# Patient Record
Sex: MALE | Race: WHITE | NOT HISPANIC OR LATINO | Employment: FULL TIME | URBAN - METROPOLITAN AREA
[De-identification: names, ages, dates, MRNs, and addresses within clinical notes are randomized per-mention and may not be internally consistent; named-entity substitution may affect disease eponyms.]

---

## 2017-03-31 RX ORDER — VENLAFAXINE HYDROCHLORIDE 75 MG/1
CAPSULE, EXTENDED RELEASE ORAL
Qty: 30 CAPSULE | Refills: 2 | Status: SHIPPED | OUTPATIENT
Start: 2017-03-31 | End: 2017-06-07 | Stop reason: SDUPTHER

## 2017-06-07 RX ORDER — VENLAFAXINE HYDROCHLORIDE 75 MG/1
CAPSULE, EXTENDED RELEASE ORAL
Qty: 30 CAPSULE | Refills: 1 | Status: SHIPPED | OUTPATIENT
Start: 2017-06-07 | End: 2017-09-27 | Stop reason: SDUPTHER

## 2017-06-08 RX ORDER — BENAZEPRIL HYDROCHLORIDE 5 MG/1
2.5 TABLET ORAL NIGHTLY
Qty: 15 TABLET | Refills: 1 | Status: SHIPPED | OUTPATIENT
Start: 2017-06-08 | End: 2017-07-13 | Stop reason: SDUPTHER

## 2017-07-13 RX ORDER — SIMVASTATIN 40 MG/1
TABLET, FILM COATED ORAL
Qty: 30 TABLET | Refills: 0 | Status: SHIPPED | OUTPATIENT
Start: 2017-07-13

## 2017-07-13 RX ORDER — BENAZEPRIL HYDROCHLORIDE 5 MG/1
2.5 TABLET ORAL NIGHTLY
Qty: 15 TABLET | Refills: 0 | Status: SHIPPED | OUTPATIENT
Start: 2017-07-13 | End: 2017-08-14 | Stop reason: SDUPTHER

## 2017-08-14 ENCOUNTER — OFFICE VISIT (OUTPATIENT)
Dept: CARDIOLOGY | Facility: CLINIC | Age: 62
End: 2017-08-14
Payer: COMMERCIAL

## 2017-08-14 VITALS
SYSTOLIC BLOOD PRESSURE: 132 MMHG | WEIGHT: 232.38 LBS | BODY MASS INDEX: 30.8 KG/M2 | HEART RATE: 98 BPM | DIASTOLIC BLOOD PRESSURE: 86 MMHG | HEIGHT: 73 IN

## 2017-08-14 DIAGNOSIS — I25.810 CORONARY ARTERY DISEASE INVOLVING CORONARY BYPASS GRAFT OF NATIVE HEART WITHOUT ANGINA PECTORIS: Primary | ICD-10-CM

## 2017-08-14 DIAGNOSIS — Z72.0 TOBACCO USE: ICD-10-CM

## 2017-08-14 DIAGNOSIS — I10 ESSENTIAL HYPERTENSION: ICD-10-CM

## 2017-08-14 DIAGNOSIS — E78.00 HYPERCHOLESTEROLEMIA: ICD-10-CM

## 2017-08-14 DIAGNOSIS — N52.9 ERECTILE DYSFUNCTION, UNSPECIFIED ERECTILE DYSFUNCTION TYPE: ICD-10-CM

## 2017-08-14 PROCEDURE — 99214 OFFICE O/P EST MOD 30 MIN: CPT | Mod: S$GLB,,, | Performed by: INTERNAL MEDICINE

## 2017-08-14 PROCEDURE — 3079F DIAST BP 80-89 MM HG: CPT | Mod: S$GLB,,, | Performed by: INTERNAL MEDICINE

## 2017-08-14 PROCEDURE — 3075F SYST BP GE 130 - 139MM HG: CPT | Mod: S$GLB,,, | Performed by: INTERNAL MEDICINE

## 2017-08-14 PROCEDURE — 3008F BODY MASS INDEX DOCD: CPT | Mod: S$GLB,,, | Performed by: INTERNAL MEDICINE

## 2017-08-14 PROCEDURE — 99999 PR PBB SHADOW E&M-EST. PATIENT-LVL III: CPT | Mod: PBBFAC,,, | Performed by: INTERNAL MEDICINE

## 2017-08-14 RX ORDER — BENAZEPRIL HYDROCHLORIDE 5 MG/1
2.5 TABLET ORAL NIGHTLY
Qty: 15 TABLET | Refills: 5 | Status: SHIPPED | OUTPATIENT
Start: 2017-08-14 | End: 2018-02-20 | Stop reason: SDUPTHER

## 2017-08-14 NOTE — PROGRESS NOTES
Subjective:    Patient ID:  Fei Gomez is a 61 y.o. male who presents for Coronary Artery Disease; Hypertension; and Hyperlipidemia      HPI  Pt was followed at [facility], NEW TO THIS CLINIC, DOING OK, NO LABS, LAST LABS 5/2016, SEE ROS  Past Medical History:   Diagnosis Date    Acute coronary syndrome     CHD (coronary heart disease)     Heart murmur     Hyperlipidemia     Hypertension      Past Surgical History:   Procedure Laterality Date    APPENDECTOMY      CARDIAC CATHETERIZATION      CHOLECYSTECTOMY      CORONARY ARTERY BYPASS GRAFT      gastric sleeve      KNEE ARTHROSCOPY       Family History   Problem Relation Age of Onset    Heart disease Father     Stroke Mother     No Known Problems Maternal Grandmother     No Known Problems Maternal Grandfather     No Known Problems Paternal Grandmother     No Known Problems Paternal Grandfather      Social History     Social History    Marital status:      Spouse name: N/A    Number of children: N/A    Years of education: N/A     Social History Main Topics    Smoking status: Never Smoker    Smokeless tobacco: Current User     Types: Snuff    Alcohol use No    Drug use: No    Sexual activity: Not Asked     Other Topics Concern    None     Social History Narrative    None       Review of patient's allergies indicates:  No Known Allergies    Current Outpatient Prescriptions:     aspirin (ECOTRIN) 81 MG EC tablet, Take 81 mg by mouth once daily., Disp: , Rfl:     benazepril (LOTENSIN) 5 MG tablet, Take 0.5 tablets (2.5 mg total) by mouth nightly., Disp: 15 tablet, Rfl: 5    CYANOCOBALAMIN, VITAMIN B-12, (B-12 DOTS ORAL), Take 1 tablet by mouth once daily. , Disp: , Rfl:     multivitamin capsule, Take 1 capsule by mouth once daily., Disp: , Rfl:     simvastatin (ZOCOR) 40 MG tablet, 1 tablet in the evening Once a day/HS Orally 30 day(s), Disp: 30 tablet, Rfl: 0    venlafaxine (EFFEXOR-XR) 75 MG 24 hr capsule, TAKE ONE CAPSULE BY  "MOUTH ONCE DAILY WITH FOOD 30 days, Disp: 30 capsule, Rfl: 1    omeprazole (PRILOSEC) 40 MG capsule, Take 1 capsule (40 mg total) by mouth every morning., Disp: 30 capsule, Rfl: 0    Review of Systems   Constitution: Negative for chills, diaphoresis, weakness, malaise/fatigue and night sweats.   HENT: Negative for congestion, hearing loss and nosebleeds.    Eyes: Negative for blurred vision, discharge, double vision and visual disturbance.   Cardiovascular: Negative for chest pain, claudication, cyanosis, dyspnea on exertion, irregular heartbeat, leg swelling, near-syncope, orthopnea, palpitations, paroxysmal nocturnal dyspnea and syncope.   Respiratory: Negative for cough, hemoptysis, shortness of breath, sleep disturbances due to breathing, sputum production and wheezing.    Endocrine: Negative for cold intolerance, heat intolerance and polyuria.   Hematologic/Lymphatic: Negative for adenopathy. Does not bruise/bleed easily.   Skin: Negative for color change, itching and nail changes.   Musculoskeletal: Negative for back pain, falls, joint pain and stiffness.   Gastrointestinal: Negative for bloating, abdominal pain, change in bowel habit, dysphagia, heartburn, hematemesis, jaundice, melena and vomiting.   Genitourinary: Negative for dysuria, flank pain and frequency.        ED   Neurological: Negative for brief paralysis, difficulty with concentration, disturbances in coordination, dizziness, focal weakness, light-headedness, loss of balance, numbness, paresthesias, seizures, sensory change and tremors.   Psychiatric/Behavioral: Negative for altered mental status, depression, memory loss and substance abuse. The patient is not nervous/anxious.    Allergic/Immunologic: Negative for hives and persistent infections.        Objective:      Vitals:    08/14/17 1200   BP: 132/86   Pulse: 98   Weight: 105.4 kg (232 lb 5.8 oz)   Height: 6' 1" (1.854 m)   PainSc: 0-No pain     Body mass index is 30.66 kg/m².    Physical " Exam   Constitutional: He is oriented to person, place, and time. He appears well-developed and well-nourished. He is active.   HENT:   Head: Normocephalic and atraumatic.   Mouth/Throat: Oropharynx is clear and moist and mucous membranes are normal.   Eyes: Conjunctivae and EOM are normal. Pupils are equal, round, and reactive to light.   Neck: Normal range of motion. Neck supple. Normal carotid pulses, no hepatojugular reflux and no JVD present. Carotid bruit is not present. No tracheal deviation, no edema and no erythema present. No thyromegaly present.   Cardiovascular: Normal rate, regular rhythm and normal heart sounds.   No extrasystoles are present. PMI is not displaced.  Exam reveals no gallop, no distant heart sounds, no friction rub and no midsystolic click.    No murmur heard.  Pulses:       Carotid pulses are 2+ on the right side, and 2+ on the left side.       Radial pulses are 2+ on the right side, and 2+ on the left side.        Femoral pulses are 2+ on the right side, and 2+ on the left side.       Popliteal pulses are 2+ on the right side, and 2+ on the left side.        Dorsalis pedis pulses are 2+ on the right side, and 2+ on the left side.        Posterior tibial pulses are 2+ on the right side, and 2+ on the left side.   Pulmonary/Chest: Effort normal and breath sounds normal. No accessory muscle usage. No tachypnea and no bradypnea. No respiratory distress.   Abdominal: Soft. Bowel sounds are normal. He exhibits no distension and no mass. There is no hepatosplenomegaly. There is no tenderness. There is no CVA tenderness.   Musculoskeletal: Normal range of motion. He exhibits no edema or deformity.   Lymphadenopathy:     He has no cervical adenopathy.   Neurological: He is alert and oriented to person, place, and time. He has normal strength. He displays no tremor. No cranial nerve deficit. He exhibits normal muscle tone. Coordination normal.   Skin: Skin is warm and dry. No cyanosis or  erythema. No pallor.   Psychiatric: He has a normal mood and affect. His speech is normal and behavior is normal. Judgment and thought content normal.               ..    Chemistry        Component Value Date/Time     05/16/2016 1443    K 4.0 05/16/2016 1443     05/16/2016 1443    CO2 24 05/16/2016 1443    BUN 14 05/16/2016 1443    CREATININE 1.02 05/16/2016 1443     (H) 05/16/2016 1443        Component Value Date/Time    CALCIUM 9.2 05/16/2016 1443    ALKPHOS 69 05/16/2016 1443    AST 23 05/16/2016 1443    AST 98 (H) 02/29/2016 1354    ALT 26 05/16/2016 1443    BILITOT 0.3 05/16/2016 1443    ESTGFRAFRICA 91 05/16/2016 1443    EGFRNONAA 78 05/16/2016 1443            ..No results found for: CHOL  No results found for: HDL  No results found for: LDLCALC  No results found for: TRIG  No results found for: CHOLHDL  ..  Lab Results   Component Value Date    WBC 3.89 (L) 02/29/2016    HGB 14.2 02/29/2016    HCT 43.9 02/29/2016    MCV 84 02/29/2016     02/29/2016       Test(s) Reviewed  I have reviewed the following in detail:  [] Stress test   [] Angiography   [] Echocardiogram   [x] Labs   [x] Other:       Assessment:         ICD-10-CM ICD-9-CM   1. Coronary artery disease involving coronary bypass graft of native heart without angina pectoris I25.810 414.05   2. Essential hypertension I10 401.9   3. Erectile dysfunction, unspecified erectile dysfunction type N52.9 607.84   4. Hypercholesterolemia E78.00 272.0   5. Tobacco use Z72.0 305.1     Problem List Items Addressed This Visit        Cardiac/Vascular    CHD (coronary heart disease) - Primary    Relevant Orders    Lipid panel    Comprehensive metabolic panel    TSH    Hypercholesterolemia    Relevant Orders    Lipid panel    Comprehensive metabolic panel    Essential hypertension    Relevant Orders    Comprehensive metabolic panel       Renal/    Erectile dysfunction    Relevant Orders    TESTOSTERONE PANEL       Other    Tobacco use       Other Visit Diagnoses    None.          Plan:         ALL CV CLINICALLY STABLE, NO ANGINA, NO HF, NO TIA, NO CLINICAL ARRHYTHMIA,CONTINUE CURRENT MEDS, EDUCATION, DIET, EXERCISE, TOBACCO CESSATION, LABS SOON, WANTS TESTOSTERONE LEVEL, RTC IN 8 MO  Coronary artery disease involving coronary bypass graft of native heart without angina pectoris  -     Cancel: Comprehensive metabolic panel; Future; Expected date: 08/14/2017  -     Cancel: Lipid panel; Future; Expected date: 08/14/2017  -     Cancel: TSH; Future; Expected date: 08/14/2017  -     Cancel: Comprehensive metabolic panel; Future; Expected date: 08/14/2017  -     Cancel: Lipid panel; Future; Expected date: 08/14/2017  -     Cancel: TSH; Future; Expected date: 08/14/2017  -     Lipid panel; Future; Expected date: 08/14/2017  -     Comprehensive metabolic panel; Future; Expected date: 08/14/2017  -     TSH; Future; Expected date: 08/14/2017    Essential hypertension  -     Cancel: Comprehensive metabolic panel; Future; Expected date: 08/14/2017  -     Cancel: Comprehensive metabolic panel; Future; Expected date: 08/14/2017  -     Cancel: Lipid panel; Future; Expected date: 08/14/2017  -     Cancel: TSH; Future; Expected date: 08/14/2017  -     Cancel: TESTOSTERONE PANEL; Future; Expected date: 08/14/2017  -     Comprehensive metabolic panel; Future; Expected date: 08/14/2017    Erectile dysfunction, unspecified erectile dysfunction type  -     Cancel: TESTOSTERONE PANEL; Future; Expected date: 08/14/2017  -     Cancel: TESTOSTERONE PANEL; Future; Expected date: 08/14/2017  -     TESTOSTERONE PANEL; Future; Expected date: 08/14/2017    Hypercholesterolemia  -     Cancel: Comprehensive metabolic panel; Future; Expected date: 08/14/2017  -     Cancel: Lipid panel; Future; Expected date: 08/14/2017  -     Cancel: Comprehensive metabolic panel; Future; Expected date: 08/14/2017  -     Cancel: Lipid panel; Future; Expected date: 08/14/2017  -     Cancel: TSH;  Future; Expected date: 08/14/2017  -     Cancel: TESTOSTERONE PANEL; Future; Expected date: 08/14/2017  -     Lipid panel; Future; Expected date: 08/14/2017  -     Comprehensive metabolic panel; Future; Expected date: 08/14/2017    Tobacco use    Other orders  -     benazepril (LOTENSIN) 5 MG tablet; Take 0.5 tablets (2.5 mg total) by mouth nightly.  Dispense: 15 tablet; Refill: 5    RTC Low level/low impact aerobic exercise 5x's/wk. Heart healthy diet and risk factor modification.    See labs and med orders.    Aerobic exercise 5x's/wk. Heart healthy diet and risk factor modification.    See labs and med orders.

## 2017-08-14 NOTE — PATIENT INSTRUCTIONS
Taking Your Blood Pressure  Blood pressure is the force of blood against the artery wall as it moves from the heart through the blood vessels. You can take your own blood pressure reading using a digital monitor. Take readings as often as your healthcare provider instructs. Take each reading at the same time of day.  Step 1. Relax    · Take your blood pressure at the same time every day, such as in the morning or evening, or at the time your healthcare provider recommends.  · Wait at least a half-hour after smoking, eating, drinking caffeinated beverages, or exercising.  · Sit comfortably at a table with both feet on the floor. Do not cross your legs or feet. Place the monitor near you.  · Rest for a few minutes before you begin.  Step 2. Wrap the cuff    · Place your arm on the table, palm up. Your arm should be at the level of your heart. Wrap the cuff around your upper arm, just above your elbow. Its best done on bare skin, not over clothing. Most cuffs will indicate where the brachial artery (the blood vessel in the middle of the arm at the inner side of the elbow) should line up with the cuff. Look in your monitor's instruction booklet for an illustration. You can also bring your cuff to your healthcare provider and have them show you how to correctly place the cuff.  · Make sure your cuff fits. If it doesnt wrap around your upper arm, order a larger cuff.  Step 3. Inflate the cuff    · Pump the cuff until the scale reads 160. If you have a self-inflating cuff, push the button that starts the pump.  · The cuff will tighten, then loosen.  · The numbers will change. When they stop changing, your blood pressure reading will appear.  · Take 2 or 3 readings one minute apart.  Step 4. Write down the results of each reading    · Write down your blood pressure numbers for each reading. Note the date and time. Keep your results in one place, such as a notebook. Even if your monitor has a built-in memory, keep a hard  copy of the readings.  · Remove the cuff from your arm. Turn off the machine.  · Share your blood pressure records with your healthcare providers at each visit.  Date Last Reviewed: 4/27/2016  © 7814-4202 [employer]. [address] [place] [address]. All rights reserved. This information is not intended as a substitute for professional medical care. Always follow your healthcare professional's instructions.        Understanding Erectile Dysfunction    Erectile dysfunction (ED) is a problem getting an erection firm enough or keeping it long enough for intercourse. The problem can happen to any man at any age. But health problems that can lead to ED become more common as a man ages. Up to half of men over age 40 experience ED at some point.  Causes of ED  ED can have many causes. Most are physical. Some are emotional issues. Often, a combination of causes is involved. Causes of ED may include:  · Medical conditions such as diabetes or depression  · Smoking tobacco or marijuana  · Drinking too much alcohol  · Side effects of medications  · Injury to nerves or blood vessels  · Emotional issues such as stress or relationship problems  ED can be treated  Prescription medications for ED are available. They help many men who try them. Depending upon the cause of the ED, though, medications may not be enough. In these cases, other treatment options are available. These include erectile aids and surgery. Your health care provider can tell you more about the treatment that is right for you. And new treatments for ED are being studied. No matter what the treatment you decide on, stay in touch with your doctor. If your symptoms persist, he or she may be able to adjust your current treatment or try something new.  Date Last Reviewed: 9/22/2014  © 2522-1271 [employer]. [address] [place] [address]. All rights reserved. This information is not intended as a substitute for  professional medical care. Always follow your healthcare professional's instructions.        Heart Disease Education    The heart beats 60 to 100 times per minute, 24 hours a day. This equals almost 1000,000 times a day. It pumps blood with oxygen and nutrients to the tissues and organs of the body. But the heart is a muscle and needs its own supply of blood. Blood flow to the heart is supplied by the coronary arteries. Coronary artery disease (atherosclerosis) is a result of cholesterol, saturated fat, and calcium deposits (plaques) that build up inside the walls. This causes inflammation within the coronary arteries. These plaques narrow the artery and reduce blood flow to the heart muscle. The reduction in blood flow to the heart muscle decreases oxygen supply to the heart. If the narrowing is significant enough, the oxygen supply to one or more regions of the heart can be temporarily or permanently shut down. This can cause chest pain, and possibly death of heart tissue (heart attack).  Types of chest pain  Angina is the name for pain in the heart muscle. Angina is a warning sign of serious heart disease. When untreated it can lead to a heart attack, also known as acute myocardial infarction, or AMI. Angina occurs when there is not enough blood and oxygen flowing to the heart for the amount of work it is doing. This most often happens during physical exertion, when the heart is working hardest. It is usually relieved by rest or nitroglycerin. Angina may also occur after a large meal when extra blood is sent to the digestive organs and less goes to the heart. In the case of advanced or unstable heart disease, angina can occur at rest or awaken you from sleep. Angina usually lasts from a few minutes up to 20 minutes or more. When treated early, the effects of angina can be reversed without permanent damage to the heart. Angina is a serious condition and needs to be evaluated by a medical professional  immediately.  There are two types of angina -- stable and unstable:  · Stable angina usually occurs with a predictable level of activity. Being stable, its character, severity, and occurrence do not change much over time. It usually starts with activity, and resolves with rest or taking your medicine as instructed by your doctor. The symptoms usually do not last long.  · Unstable angina changes or gets worse over time. It is different from whatever you are used to. It may feel different or worse, begin without cause, occur with exercise or exertion, wake you up from sleep, and last longer. It may not respond in the same way as it does when you take your usual medicines for an attack. This type of angina can be a warning sign of an impending heart attack.     A heart attack is usually the result of a blood clot that suddenly forms in a coronary artery that has been narrowed with plaque. When this occurs, blood flow may be cut off to a part of the heart muscle, causing the cells to die. This weakens the pumping action of the heart, which affects the delivery of blood to all the other organs in the body including the brain. This damage is not reversible. However, early treatment can limit the amount of damage.  The pain you feel with angina and a heart attack may have a similar quality. However, it is usually different in intensity and duration. Here are some typical descriptions of a heart attack:  · It is most often experienced as a squeezing, crushing, pressure-like sensation in the center of the chest.  · It is sometimes described as something heavy sitting on my chest.  · It may feel more like a bad case of indigestion.  · The pain may spread from the chest to the arm, shoulder, throat or jaw.  · Sometimes the pain is not felt in the chest at all, but only in the arm, shoulder, throat or jaw.  · There may also be nausea, vomiting, dizziness or light-headedness, sweating and trouble breathing.  · Palpitations, or  "your heart beating rapidly  · A new, irregular heart beat  · Unexplained weakness  You may not be able to tell the difference between "bad" angina and a heart attack at home. Seek help if your symptoms are different than usual. Do not be in denial or just try to "tough it out."  Call 911  This is the fastest and safest way to get to the emergency department. The paramedics can also start treatment on the way to the hospital, saving valuable time for your heart.  · If the angina gets worse, if it continues, or if it stops and returns, call 911 immediately. Do not delay. You may be having a heart attack.  · After you call 911, take a second tablet or spray unless instructed otherwise. When repeating doses, sit down if possible, because it can make you feel lightheaded or dizzy. Wait another 5 minutes. If the angina still does not go away, take a third tablet or spray. Do not take more than 3 tablets or sprays within 15 minutes. Stay on the phone with 911 for further instruction.  · Your healthcare provider may give you slightly different instructions than those above. If so, follow them carefully.  Do not wait until symptoms become severe to call 911.  Other reasons to call 911 include:  · Trouble breathing  · Feeling lightheaded, faint, or dizzy  · Rapid heart beat  · Slower than usual heart rate compared to your normal  · Angina with weakness, dizziness, fainting, heavy sweating, nausea, or vomiting  · Extreme drowsiness, confusion  · Weakness of an arm or leg or one side of the face  · Difficulty with speech or vision  When to seek medical care  Remember, the signs and symptoms of a heart attack are not always like they are on TV. Sometimes they are not so obvious. You may only feel weak, or just not right. If it is not clear or if you have any doubt, call for advice.  · Seek help if there is a change in the type of pain, if it feels different, or if your symptoms are mild.  · Do not drive yourself. Have someone " "else drive you. If no one can drive, call 911.  · Do not delay. Fast diagnosis and treatment can prevent or limit the amount of heart damage during a heart attack.  · Do not go to your doctor's office or a clinic as they may not be able to provide all the testing and treatment required for this condition.  · If your doctor has given you medicine to take when symptoms occur, take them but don't delay getting help trying to locate medicines.  What happens in the emergency department  The emergency department is connected to your local emergency medical system (EMS) through 911. That's why during a cardiac emergency, calling 911 is the fastest way to get help. The goal of the emergency department is to rapidly screen, evaluate, and treat people.  Once you are there, an electrocardiogram (ECG or heart tracing) will be done. Blood samples may be taken to look for the presence of heart enzymes that leak from damaged heart cells and show if a heart attack is occurring. You will often be evaluated by a heart specialist (cardiologist) who decides the best course of action. In the case of severe angina or early heart attack, and depending on the circumstances, powerful "clot busting" medicines can be used to dissolve blood clots in the coronary artery. In other cases, you may be taken to a cardiac catheterization lab. Here, a tiny balloon-tipped catheter is advanced through blood vessels to the heart. There the balloon is inflated pushing open the blood vessel restoring blood flow.  Risk factors for heart disease  Risk factors for heart disease are a combination of genetic and lifestyle. Many risk factors work by either directly or indirectly damaging the blood vessels of the heart, or by increasing the risk of forming blood or cholesterol clots, which then clog up and block the arteries.     Examples of physical lifestyle risk factors:  · Cigarette smoking  · High blood pressure  · High blood cholesterol  · Use of stimulant " drugs such as cocaine, crack, and amphetamines  · Eating a high-fat, high-cholesterol meal  · Diabetes   · Obesity which increases risk for diabetes and high blood pressure  · Lack of regular physical activity     Examples of emotional lifestyle factors:  · Chronic high stress levels release stress hormones. These raise blood pressure and cholesterol level and makes blood clot more easily.  · Held-in anger, hostile or cynical attitude  · Social and emotional isolation, lack of intimacy  · Loss of relationship  · Depression  Other factors that increase the risk of heart attack that you cannot control :  · Age. The older you get beyond 40, the greater is your risk of significant coronary artery disease.  · Gender. More men than women get heart disease; but once past menopause, women who are not taking estrogen replacement have the same risk as men for a heart attack.  · Family history. If your mother, father, brother or sister has coronary artery disease, your risk of having it is higher than a person your age without this family history.  What can you do to decrease your risk  To reduce your risk of heart disease:  · Get regular checkups with your doctor.  · Take your medicines for blood pressure, cholesterol or diabetes as directed.  · Watch your diet. Eat a heart healthy diet choosing fresh foods, less salt, cholesterol, and fat  · Stop smoking. Get help if needed.  · Get regular exercise.  · Manage stress.  · Carry a list of medicines and doses in your wallet.  Date Last Reviewed: 12/30/2015  © 5702-8207 [employer]. [address] [place] [address], [place] [address]. All rights reserved. This information is not intended as a substitute for professional medical care. Always follow your healthcare professional's instructions.        Coping with Smoking Withdrawal  For the first few days after you quit smoking, you may feel cranky, restless, depressed, or low on energy. These are symptoms of withdrawal. Your  body needs time to recover from smoking. Your symptoms should lessen within a few days.    Coping with the urge to smoke  · Deep-breathe. Inhale through your nose. Count to five. Slowly exhale through your mouth.  · Drink water. Try to drink eight or more 8-ounce glasses of water a day.  · Keep your hands busy. Wash your car. Draw. Do a puzzle. Build a [employer].  · Delay. The urge to smoke lasts only 3 to 5 minutes.  Get support  Individual, group, and telephone counseling can help keep you on track. Ask your healthcare provider for more information about resources available to you.  Control stress  After you quit, you may feel irritable and stressed. Try taking a warm bath or shower. Listen to music. Try yoga or meditate. Call friends or talk with a professional.  Exercise  Exercise helps your body and mind feel better. There are many ways to be more active. Find something you enjoy doing. See if a friend will join you for a walk or a bike ride.  Sleep better  You may feel tired but have trouble falling asleep. Try to relax before bed. Do a few stretching exercises. Read for a while. Also, avoid caffeine for at least a few hours before bedtime.  Get fit, not fat  You may notice an increased appetite. Many people who quit smoking gain a few pounds. To limit weight gain, try to watch what you eat. Cut back on fat in your diet. Snack on low-calorie foods, like fresh fruits and vegetables. Drink low-calorie liquids, especially water. Regular exercise can also help you stay fit. And remember: Your main goal is to be a nonsmoker. Stay focused on that goal.  Quit-smoking products  There are a number of products that can help you quit smoking including medications and nicotine replacement products. They are available over the counter or by prescription. Ask your healthcare provider if any of these could help you quit smoking.        For more information  · smokefree.gov/bvku-dc-oi-expert  · National Cancer [place]  Smoking Quitline: 877-44U-QUIT (430-357-7113)      Date Last Reviewed: 11/17/2014  © 1155-5097 [employer]. [address] [place] [address], [place] [address]. All rights reserved. This information is not intended as a substitute for professional medical care. Always follow your healthcare professional's instructions.        Understanding Coronary Artery Disease (CAD)    To understand coronary artery disease (CAD), you need to know how your heart works. Your heart is a muscle that pumps blood throughout your body. To work right, your heart needs a steady supply of oxygen. It gets this oxygen from blood supplied by the coronary arteries.        Healthy Artery      Damaged Artery      Narrowed Artery      Blocked Artery   Healthy artery. When a coronary artery is healthy and has no blockages, blood flows through easily. Healthy arteries can easily supply the oxygen-rich blood your heart needs.  Damaged artery. Coronary artery disease begins when damage to the artery lining leads to the buildup of fat-like substances and cholesterol along the artery wall. This is called plaque. This damage could be caused by things like high blood pressure or smoking. This plaque buildup begins to narrow the arteries carrying blood to the heart. This is called atherosclerosis,  Narrowed artery. As more plaque builds up, your artery has trouble supplying blood to your heart muscle when it needs it most, such as during exercise. You may not feel any symptoms when this happens. Or you may feel angina--pressure, tightness, achiness, or pain in your chest, jaw, neck, back, or arm.  Blocked artery. A piece of plaque may break off and completely block the artery. Or a blood clot may plug the narrowed artery. When this happens, blood flow is blocked from reaching the heart. Without oxygen-rich blood, part of the heart muscle becomes damaged and stops working. You may feel crushing pressure or pain in or around your chest. This is a heart  attack (acute myocardial infarction, or AMI) and is a medical emergency.  Date Last Reviewed: 3/28/2016  © 9974-4074 [employer]. [address] [place] [address], [place] [address]. All rights reserved. This information is not intended as a substitute for professional medical care. Always follow your healthcare professional's instructions.

## 2017-09-27 RX ORDER — VENLAFAXINE HYDROCHLORIDE 75 MG/1
75 CAPSULE, EXTENDED RELEASE ORAL DAILY
Qty: 90 CAPSULE | Refills: 1 | Status: SHIPPED | OUTPATIENT
Start: 2017-09-27 | End: 2017-09-27 | Stop reason: SDUPTHER

## 2017-09-27 NOTE — TELEPHONE ENCOUNTER
Effexor sent to pharmacy, Dr Green refers pt to new PCP for testerone, CG verbalizes understanding

## 2017-09-27 NOTE — TELEPHONE ENCOUNTER
----- Message from Miguel Dimas sent at 9/27/2017  3:48 PM CDT -----  Contact: Wife-Diana  [phone]  Patient needs a refill on rx Effexor 75 mg, the patient's wife asking for the status for testosterone vaccine. Thanks!

## 2017-12-12 ENCOUNTER — TELEPHONE (OUTPATIENT)
Dept: CARDIOLOGY | Facility: CLINIC | Age: 62
End: 2017-12-12

## 2017-12-12 NOTE — TELEPHONE ENCOUNTER
----- Message from Nakia Gregory sent at 12/12/2017  3:35 PM CST -----  Contact: wife, Diana Chancellor  Patient's wife, Diana Gomez regarding PCP doctor needs a clearance to write a prescription for marginally low testosterone. Please call patient at [phone] or wife at [phone]. Thanks!

## 2018-02-20 DIAGNOSIS — I10 HYPERTENSION, UNSPECIFIED TYPE: Primary | ICD-10-CM

## 2018-02-21 RX ORDER — BENAZEPRIL HYDROCHLORIDE 5 MG/1
2.5 TABLET ORAL NIGHTLY
Qty: 15 TABLET | Refills: 5 | Status: SHIPPED | OUTPATIENT
Start: 2018-02-21 | End: 2020-07-29 | Stop reason: DRUGHIGH

## 2020-06-12 ENCOUNTER — TELEPHONE (OUTPATIENT)
Dept: CARDIOLOGY | Facility: CLINIC | Age: 65
End: 2020-06-12

## 2020-06-12 DIAGNOSIS — I25.10 CORONARY ARTERY DISEASE INVOLVING NATIVE CORONARY ARTERY WITHOUT ANGINA PECTORIS, UNSPECIFIED WHETHER NATIVE OR TRANSPLANTED HEART: Primary | ICD-10-CM

## 2020-06-12 DIAGNOSIS — E78.00 HYPERCHOLESTEROLEMIA: ICD-10-CM

## 2020-06-12 DIAGNOSIS — I10 ESSENTIAL HYPERTENSION: ICD-10-CM

## 2020-06-12 NOTE — TELEPHONE ENCOUNTER
Spoke to pt wife and informed will send lab orders over to [facility] per request. Pt wife verbalized understanding

## 2020-06-12 NOTE — TELEPHONE ENCOUNTER
----- Message from Patrica Soto sent at 6/12/2020 10:59 AM CDT -----  Contact: pt wife  Type: Needs Medical Advice  Who Called:  pt    Pharmacy name and phone #:   Best Call Back Number: [phone]  Additional Information: pt wife says that her  needs orders put in to take labs next week before their visit. Please call to advise.

## 2020-07-29 ENCOUNTER — OFFICE VISIT (OUTPATIENT)
Dept: CARDIOLOGY | Facility: CLINIC | Age: 65
End: 2020-07-29
Payer: COMMERCIAL

## 2020-07-29 VITALS
DIASTOLIC BLOOD PRESSURE: 92 MMHG | BODY MASS INDEX: 31.82 KG/M2 | SYSTOLIC BLOOD PRESSURE: 124 MMHG | WEIGHT: 240.06 LBS | OXYGEN SATURATION: 98 % | HEIGHT: 73 IN | HEART RATE: 90 BPM

## 2020-07-29 DIAGNOSIS — Z72.0 TOBACCO USE: ICD-10-CM

## 2020-07-29 DIAGNOSIS — R09.89 ARTERIAL BRUIT: ICD-10-CM

## 2020-07-29 DIAGNOSIS — I10 ESSENTIAL HYPERTENSION: ICD-10-CM

## 2020-07-29 DIAGNOSIS — I25.719 CORONARY ARTERY DISEASE INVOLVING AUTOLOGOUS VEIN CORONARY BYPASS GRAFT WITH ANGINA PECTORIS: Primary | ICD-10-CM

## 2020-07-29 DIAGNOSIS — E78.00 HYPERCHOLESTEROLEMIA: ICD-10-CM

## 2020-07-29 DIAGNOSIS — E66.9 OBESITY, CLASS I, BMI 30-34.9: ICD-10-CM

## 2020-07-29 PROCEDURE — 1126F PR PAIN SEVERITY QUANTIFIED, NO PAIN PRESENT: ICD-10-PCS | Mod: S$GLB,,, | Performed by: INTERNAL MEDICINE

## 2020-07-29 PROCEDURE — 1126F AMNT PAIN NOTED NONE PRSNT: CPT | Mod: S$GLB,,, | Performed by: INTERNAL MEDICINE

## 2020-07-29 PROCEDURE — 3080F PR MOST RECENT DIASTOLIC BLOOD PRESSURE >= 90 MM HG: ICD-10-PCS | Mod: CPTII,S$GLB,, | Performed by: INTERNAL MEDICINE

## 2020-07-29 PROCEDURE — 3008F PR BODY MASS INDEX (BMI) DOCUMENTED: ICD-10-PCS | Mod: CPTII,S$GLB,, | Performed by: INTERNAL MEDICINE

## 2020-07-29 PROCEDURE — 3074F SYST BP LT 130 MM HG: CPT | Mod: CPTII,S$GLB,, | Performed by: INTERNAL MEDICINE

## 2020-07-29 PROCEDURE — 3008F BODY MASS INDEX DOCD: CPT | Mod: CPTII,S$GLB,, | Performed by: INTERNAL MEDICINE

## 2020-07-29 PROCEDURE — 1159F PR MEDICATION LIST DOCUMENTED IN MEDICAL RECORD: ICD-10-PCS | Mod: S$GLB,,, | Performed by: INTERNAL MEDICINE

## 2020-07-29 PROCEDURE — 99214 PR OFFICE/OUTPT VISIT, EST, LEVL IV, 30-39 MIN: ICD-10-PCS | Mod: S$GLB,,, | Performed by: INTERNAL MEDICINE

## 2020-07-29 PROCEDURE — 3074F PR MOST RECENT SYSTOLIC BLOOD PRESSURE < 130 MM HG: ICD-10-PCS | Mod: CPTII,S$GLB,, | Performed by: INTERNAL MEDICINE

## 2020-07-29 PROCEDURE — 1101F PT FALLS ASSESS-DOCD LE1/YR: CPT | Mod: CPTII,S$GLB,, | Performed by: INTERNAL MEDICINE

## 2020-07-29 PROCEDURE — 99214 OFFICE O/P EST MOD 30 MIN: CPT | Mod: S$GLB,,, | Performed by: INTERNAL MEDICINE

## 2020-07-29 PROCEDURE — 1159F MED LIST DOCD IN RCRD: CPT | Mod: S$GLB,,, | Performed by: INTERNAL MEDICINE

## 2020-07-29 PROCEDURE — 3080F DIAST BP >= 90 MM HG: CPT | Mod: CPTII,S$GLB,, | Performed by: INTERNAL MEDICINE

## 2020-07-29 PROCEDURE — 1101F PR PT FALLS ASSESS DOC 0-1 FALLS W/OUT INJ PAST YR: ICD-10-PCS | Mod: CPTII,S$GLB,, | Performed by: INTERNAL MEDICINE

## 2020-07-29 RX ORDER — BENAZEPRIL HYDROCHLORIDE 5 MG/1
5 TABLET ORAL NIGHTLY
Qty: 90 TABLET | Refills: 1 | Status: SHIPPED | OUTPATIENT
Start: 2020-07-29 | End: 2022-08-16
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
## 2020-08-03 ENCOUNTER — CLINICAL SUPPORT (OUTPATIENT)
Dept: CARDIOLOGY | Facility: CLINIC | Age: 65
End: 2020-08-03
Attending: INTERNAL MEDICINE
Payer: COMMERCIAL

## 2020-08-03 DIAGNOSIS — R09.89 ARTERIAL BRUIT: ICD-10-CM

## 2020-08-03 LAB
LEFT CBA DIAS: 20 CM/S
LEFT CBA SYS: 66 CM/S
LEFT CCA DIST DIAS: 21 CM/S
LEFT CCA DIST SYS: 79 CM/S
LEFT CCA MID DIAS: 23 CM/S
LEFT CCA MID SYS: 85 CM/S
LEFT CCA PROX DIAS: 25 CM/S
LEFT CCA PROX SYS: 115 CM/S
LEFT ECA DIAS: 12 CM/S
LEFT ECA SYS: 76 CM/S
LEFT ICA DIST DIAS: 26 CM/S
LEFT ICA DIST SYS: 55 CM/S
LEFT ICA MID DIAS: 24 CM/S
LEFT ICA MID SYS: 55 CM/S
LEFT ICA PROX DIAS: 31 CM/S
LEFT ICA PROX SYS: 71 CM/S
LEFT VERTEBRAL DIAS: 19 CM/S
LEFT VERTEBRAL SYS: 65 CM/S
OHS CV CAROTID RIGHT ICA EDV HIGHEST: 26
OHS CV CAROTID ULTRASOUND LEFT ICA/CCA RATIO: 0.62
OHS CV CAROTID ULTRASOUND RIGHT ICA/CCA RATIO: 0.82
OHS CV PV CAROTID LEFT HIGHEST CCA: 115
OHS CV PV CAROTID LEFT HIGHEST ICA: 71
OHS CV PV CAROTID RIGHT HIGHEST CCA: 100
OHS CV PV CAROTID RIGHT HIGHEST ICA: 82
OHS CV US CAROTID LEFT HIGHEST EDV: 31
RIGHT CBA DIAS: 25 CM/S
RIGHT CBA SYS: 105 CM/S
RIGHT CCA DIST DIAS: 20 CM/S
RIGHT CCA DIST SYS: 85 CM/S
RIGHT CCA MID DIAS: 223 CM/S
RIGHT CCA MID SYS: 100 CM/S
RIGHT CCA PROX DIAS: 14 CM/S
RIGHT CCA PROX SYS: 90 CM/S
RIGHT ECA DIAS: 13 CM/S
RIGHT ECA SYS: 87 CM/S
RIGHT ICA DIST DIAS: 26 CM/S
RIGHT ICA DIST SYS: 82 CM/S
RIGHT ICA MID DIAS: 22 CM/S
RIGHT ICA MID SYS: 52 CM/S
RIGHT ICA PROX DIAS: 14 CM/S
RIGHT ICA PROX SYS: 76 CM/S
RIGHT VERTEBRAL DIAS: 19 CM/S
RIGHT VERTEBRAL SYS: 55 CM/S

## 2020-08-03 PROCEDURE — 93880 CV US DOPPLER CAROTID (CUPID ONLY): ICD-10-PCS | Mod: S$GLB,,, | Performed by: INTERNAL MEDICINE

## 2020-08-03 PROCEDURE — 93880 EXTRACRANIAL BILAT STUDY: CPT | Mod: S$GLB,,, | Performed by: INTERNAL MEDICINE

## 2020-08-11 ENCOUNTER — HOSPITAL ENCOUNTER (OUTPATIENT)
Dept: RADIOLOGY | Facility: HOSPITAL | Age: 65
Discharge: HOME | End: 2020-08-11
Attending: INTERNAL MEDICINE
Payer: COMMERCIAL

## 2020-08-11 ENCOUNTER — CLINICAL SUPPORT (OUTPATIENT)
Dept: CARDIOLOGY | Facility: CLINIC | Age: 65
End: 2020-08-11
Attending: INTERNAL MEDICINE
Payer: COMMERCIAL

## 2020-08-11 VITALS — BODY MASS INDEX: 31.81 KG/M2 | WEIGHT: 240 LBS | HEIGHT: 73 IN

## 2020-08-11 DIAGNOSIS — I25.719 CORONARY ARTERY DISEASE INVOLVING AUTOLOGOUS VEIN CORONARY BYPASS GRAFT WITH ANGINA PECTORIS: ICD-10-CM

## 2020-08-11 LAB
CV STRESS BASE HR: 85 BPM
DIASTOLIC BLOOD PRESSURE: 96 MMHG
NUC REST EJECTION FRACTION: 56
NUC STRESS EJECTION FRACTION: 69 %
OHS CV CPX 1 MINUTE RECOVERY HEART RATE: 146 BPM
OHS CV CPX 85 PERCENT MAX PREDICTED HEART RATE MALE: 131
OHS CV CPX ESTIMATED METS: 9
OHS CV CPX MAX PREDICTED HEART RATE: 154
OHS CV CPX PATIENT IS FEMALE: 0
OHS CV CPX PATIENT IS MALE: 1
OHS CV CPX PEAK DIASTOLIC BLOOD PRESSURE: 105 MMHG
OHS CV CPX PEAK HEAR RATE: 160 BPM
OHS CV CPX PEAK RATE PRESSURE PRODUCT: NORMAL
OHS CV CPX PEAK SYSTOLIC BLOOD PRESSURE: 222 MMHG
OHS CV CPX PERCENT MAX PREDICTED HEART RATE ACHIEVED: 104
OHS CV CPX RATE PRESSURE PRODUCT PRESENTING: NORMAL
STRESS ECHO POST EXERCISE DUR MIN: 6 MINUTES
STRESS ECHO POST EXERCISE DUR SEC: 1 SECONDS
SYSTOLIC BLOOD PRESSURE: 145 MMHG

## 2020-08-11 PROCEDURE — 93018 CV STRESS TEST I&R ONLY: CPT | Mod: ,,, | Performed by: INTERNAL MEDICINE

## 2020-08-11 PROCEDURE — 93016 CV STRESS TEST SUPVJ ONLY: CPT | Mod: ,,, | Performed by: INTERNAL MEDICINE

## 2020-08-11 PROCEDURE — 93018 PR CARDIAC STRESS TST,INTERP/REPT ONLY: ICD-10-PCS | Mod: ,,, | Performed by: INTERNAL MEDICINE

## 2020-08-11 PROCEDURE — A9502 TC99M TETROFOSMIN: HCPCS | Mod: PO

## 2020-08-11 PROCEDURE — 93017 CV STRESS TEST TRACING ONLY: CPT

## 2020-08-11 PROCEDURE — 78452 STRESS TEST WITH MYOCARDIAL PERFUSION (CUPID ONLY): ICD-10-PCS | Mod: 26,,, | Performed by: INTERNAL MEDICINE

## 2020-08-11 PROCEDURE — 99999 PR PBB SHADOW E&M-EST. PATIENT-LVL I: ICD-10-PCS | Mod: PBBFAC,,,

## 2020-08-11 PROCEDURE — 93016 STRESS TEST WITH MYOCARDIAL PERFUSION (CUPID ONLY): ICD-10-PCS | Mod: ,,, | Performed by: INTERNAL MEDICINE

## 2020-08-11 PROCEDURE — 78452 HT MUSCLE IMAGE SPECT MULT: CPT | Mod: 26,,, | Performed by: INTERNAL MEDICINE

## 2020-08-11 PROCEDURE — 99999 PR PBB SHADOW E&M-EST. PATIENT-LVL I: CPT | Mod: PBBFAC,,,

## 2020-08-13 ENCOUNTER — CLINICAL SUPPORT (OUTPATIENT)
Dept: CARDIOLOGY | Facility: CLINIC | Age: 65
End: 2020-08-13
Attending: INTERNAL MEDICINE
Payer: COMMERCIAL

## 2020-08-13 VITALS — HEART RATE: 83 BPM | HEIGHT: 73 IN | WEIGHT: 240.06 LBS | BODY MASS INDEX: 31.82 KG/M2

## 2020-08-13 LAB
ASCENDING AORTA: 3.31 CM
AV PEAK GRADIENT: 4 MMHG
AV VELOCITY RATIO: 0.96
BSA FOR ECHO PROCEDURE: 2.37 M2
CV ECHO LV RWT: 0.54 CM
DOP CALC AO PEAK VEL: 0.95 M/S
DOP CALC AO VTI: 17.64 CM
DOP CALC LVOT AREA: 3.9 CM2
DOP CALC LVOT DIAMETER: 2.22 CM
DOP CALC LVOT PEAK VEL: 0.91 M/S
E WAVE DECELERATION TIME: 220.13 MSEC
E/A RATIO: 0.8
E/E' RATIO: 7.88 M/S
ECHO LV POSTERIOR WALL: 1.03 CM (ref 0.6–1.1)
FRACTIONAL SHORTENING: 32 % (ref 28–44)
INTERVENTRICULAR SEPTUM: 1.03 CM (ref 0.6–1.1)
IVRT: 103.81 MSEC
LA MAJOR: 4.88 CM
LA MINOR: 4.8 CM
LA WIDTH: 3.47 CM
LEFT ATRIUM SIZE: 3.26 CM
LEFT ATRIUM VOLUME INDEX: 20 ML/M2
LEFT ATRIUM VOLUME: 46.54 CM3
LEFT INTERNAL DIMENSION IN SYSTOLE: 2.61 CM (ref 2.1–4)
LEFT VENTRICLE DIASTOLIC VOLUME INDEX: 27 ML/M2
LEFT VENTRICLE DIASTOLIC VOLUME: 62.78 ML
LEFT VENTRICLE MASS INDEX: 53 G/M2
LEFT VENTRICLE SYSTOLIC VOLUME INDEX: 10.7 ML/M2
LEFT VENTRICLE SYSTOLIC VOLUME: 24.79 ML
LEFT VENTRICULAR INTERNAL DIMENSION IN DIASTOLE: 3.82 CM (ref 3.5–6)
LEFT VENTRICULAR MASS: 123.37 G
LV LATERAL E/E' RATIO: 7.88 M/S
LV SEPTAL E/E' RATIO: 7.88 M/S
MV PEAK A VEL: 0.79 M/S
MV PEAK E VEL: 0.63 M/S
MV STENOSIS PRESSURE HALF TIME: 63.84 MS
MV VALVE AREA P 1/2 METHOD: 3.45 CM2
PISA TR MAX VEL: 2.5 M/S
PULM VEIN S/D RATIO: 3.15
PV PEAK D VEL: 0.27 M/S
PV PEAK S VEL: 0.85 M/S
PV PEAK VELOCITY: 0.69 CM/S
RA MAJOR: 4.37 CM
RA PRESSURE: 3 MMHG
RA WIDTH: 3.62 CM
RIGHT VENTRICULAR END-DIASTOLIC DIMENSION: 2.37 CM
SINUS: 2.6 CM
STJ: 3.24 CM
TDI LATERAL: 0.08 M/S
TDI SEPTAL: 0.08 M/S
TDI: 0.08 M/S
TR MAX PG: 25 MMHG
TRICUSPID ANNULAR PLANE SYSTOLIC EXCURSION: 1.69 CM
TV REST PULMONARY ARTERY PRESSURE: 28 MMHG

## 2022-08-16 PROBLEM — E11.9 TYPE 2 DIABETES MELLITUS WITHOUT COMPLICATION, WITHOUT LONG-TERM CURRENT USE OF INSULIN: Status: ACTIVE | Noted: 2022-08-16

## 2022-08-16 PROBLEM — I95.1 ORTHOSTASIS: Status: ACTIVE | Noted: 2022-08-16

## 2022-08-16 PROBLEM — Z95.1 S/P CABG (CORONARY ARTERY BYPASS GRAFT): Status: ACTIVE | Noted: 2022-08-16

## 2024-02-14 PROBLEM — E78.2 MIXED HYPERLIPIDEMIA: Status: ACTIVE | Noted: 2024-02-14
